# Patient Record
Sex: FEMALE | Race: WHITE | Employment: UNEMPLOYED | URBAN - METROPOLITAN AREA
[De-identification: names, ages, dates, MRNs, and addresses within clinical notes are randomized per-mention and may not be internally consistent; named-entity substitution may affect disease eponyms.]

---

## 2017-10-16 ENCOUNTER — HOSPITAL ENCOUNTER (EMERGENCY)
Age: 82
Discharge: HOME | End: 2017-10-16
Attending: EMERGENCY MEDICINE
Payer: MEDICARE

## 2017-10-16 VITALS
HEIGHT: 63 IN | RESPIRATION RATE: 16 BRPM | TEMPERATURE: 97.6 F | SYSTOLIC BLOOD PRESSURE: 129 MMHG | OXYGEN SATURATION: 97 % | HEART RATE: 68 BPM | WEIGHT: 130 LBS | BODY MASS INDEX: 23.04 KG/M2 | DIASTOLIC BLOOD PRESSURE: 102 MMHG
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
## 2017-10-16 PROCEDURE — 99283 EMERGENCY DEPT VISIT LOW MDM: CPT

## 2017-10-16 RX ORDER — FERROUS SULFATE 325(65) MG
325 TABLET ORAL
COMMUNITY

## 2017-10-16 RX ORDER — DONEPEZIL HYDROCHLORIDE 10 MG/1
10 TABLET, FILM COATED ORAL NIGHTLY
COMMUNITY

## 2017-10-16 RX ORDER — CHOLECALCIFEROL (VITAMIN D3) 125 MCG
500 CAPSULE ORAL DAILY
COMMUNITY

## 2017-10-16 RX ORDER — ASPIRIN 81 MG/1
81 TABLET, CHEWABLE ORAL DAILY
COMMUNITY

## 2017-10-16 RX ORDER — LISINOPRIL 5 MG/1
5 TABLET ORAL DAILY
COMMUNITY

## 2017-10-16 NOTE — ED PROVIDER NOTES
[address] [facility] ED  eMERGENCY dEPARTMENT eNCOUnter      Pt Name: Zaki Michelle  MRN: [id number]  [place] 1/13/1922  Date of evaluation: 10/16/2017  Provider: Ori Resendiz MD    CHIEF COMPLAINT       Chief Complaint   Patient presents with    Fall     Pt found on ground at [facility]. \"Fall\" unwitnessed. Pt has history of Alzheimers/Dementia and does not remember what happend. HISTORY OF PRESENT ILLNESS   (Location/Symptom, Timing/Onset, Context/Setting, Quality, Duration, Modifying Factors, Severity)  Note limiting factors. Zaki Michelle is a 80 y.o. female who presents to the emergency department For evaluation after a fall from a nursing home. Patient is a resident of local nursing home was found on the floor next to her bed she does not recall what happened. Because of the fall nursing home sent her in for evaluation. She is not really complaining of any pain. Here in the emergency room she does not recall any history but she currently has no complaints of pain. Her daughter is with her right now and says she is acting normally with stirrup side of the bed without a problem. HPI    Nursing Notes were reviewed. REVIEW OF SYSTEMS    (2-9 systems for level 4, 10 or more for level 5)     Review of Systems   Unable to perform ROS: Dementia       Except as noted above the remainder of the review of systems was reviewed and negative. PAST MEDICAL HISTORY     Past Medical History:   Diagnosis Date    Anemia     Arthritis     Dementia     Hypertension     Legally blind     Macular degeneration          SURGICAL HISTORY     No past surgical history on file.       CURRENT MEDICATIONS       Previous Medications    ACIDOPHILUS LACTOBACILLUS PO    Take 1 tablet by mouth three times daily Last dose 10/17    ASPIRIN 81 MG CHEWABLE TABLET    Take 81 mg by mouth daily    DONEPEZIL (ARICEPT) 10 MG TABLET    Take 10 mg by mouth nightly    FERROUS SULFATE 325 (65 FE) MG TABLET    Take 325 mg by mouth daily (with breakfast)    LISINOPRIL (PRINIVIL;ZESTRIL) 5 MG TABLET    Take 5 mg by mouth daily    METOPROLOL TARTRATE (LOPRESSOR) 25 MG TABLET    Take 25 mg by mouth daily    VITAMIN B-12 (CYANOCOBALAMIN) 500 MCG TABLET    Take 500 mcg by mouth daily       ALLERGIES     Review of patient's allergies indicates no known allergies. FAMILY HISTORY     No family history on file. SOCIAL HISTORY       Social History     Social History    Marital status:      Spouse name: N/A    Number of children: N/A    Years of education: N/A     Social History Main Topics    Smoking status: Unknown If Ever Smoked    Smokeless tobacco: Not on file    Alcohol use No    Drug use: No    Sexual activity: Not on file     Other Topics Concern    Not on file     Social History Narrative    No narrative on file       SCREENINGS             PHYSICAL EXAM    (up to 7 for level 4, 8 or more for level 5)     ED Triage Vitals [10/16/17 0121]   BP Temp Temp Source Pulse Resp SpO2 Height Weight   (!) 129/102 97.6 °F (36.4 °C) Tympanic 68 16 97 % 5' 3\" (1.6 m) 130 lb (59 kg)       Physical Exam   Constitutional: She appears well-developed. No distress. HENT:   Head: Normocephalic and atraumatic. Nose: Nose normal.   Eyes: Conjunctivae are normal. Pupils are equal, round, and reactive to light. Neck: Normal range of motion. Neck supple. Cardiovascular: Normal rate, regular rhythm, normal heart sounds and intact distal pulses. Pulmonary/Chest: Effort normal and breath sounds normal.   Abdominal: Soft. Bowel sounds are normal. There is no tenderness. There is no guarding. Musculoskeletal: Normal range of motion. Neurological: She is alert. Skin: Skin is warm and dry. She is not diaphoretic. Psychiatric: Her behavior is normal.   Nursing note and vitals reviewed.       DIAGNOSTIC RESULTS     EKG: All EKG's are interpreted by the Emergency Department Physician who either signs or

## 2017-10-16 NOTE — ED NOTES
Report called to Arabella Amador at [facility].       Tomasa Lozano RN  10/16/17 [id number]

## 2017-10-16 NOTE — ED NOTES
Bed: 10  Expected date: 10/16/17  Expected time: 1:01 AM  Means of arrival: [facility]  Comments:  [id number] Melida Crum RN  94/62/92 [id number]

## 2017-10-16 NOTE — ED NOTES
Spoke with nursing home and received report about pt. Nurse states pt has episodes of being \"combative and losing consciousness\". Dr. Daina Salgado aware.       Hannah Coats RN  10/16/17 2147